# Patient Record
Sex: FEMALE | Race: WHITE | NOT HISPANIC OR LATINO
[De-identification: names, ages, dates, MRNs, and addresses within clinical notes are randomized per-mention and may not be internally consistent; named-entity substitution may affect disease eponyms.]

---

## 2020-05-29 PROBLEM — Z00.00 ENCOUNTER FOR PREVENTIVE HEALTH EXAMINATION: Status: ACTIVE | Noted: 2020-05-29

## 2020-06-16 ENCOUNTER — APPOINTMENT (OUTPATIENT)
Dept: PLASTIC SURGERY | Facility: CLINIC | Age: 30
End: 2020-06-16
Payer: COMMERCIAL

## 2020-06-16 PROCEDURE — 99204 OFFICE O/P NEW MOD 45 MIN: CPT

## 2020-07-01 NOTE — HISTORY OF PRESENT ILLNESS
[FreeTextEntry1] : 28 y/o F referred from Dr. 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 She desires larger breasts. She is otherwise healthy and does not smoke cigarettes. She has never had surgery before.

## 2020-07-01 NOTE — ASSESSMENT
[FreeTextEntry1] : I reviewed with the patient the risks benefits and alternatives of breast augmentation with prosthetic implants. The goal of the operation is to create a more enlarged breast mound by placing the implant in either the subglandular or dual plane/sub muscular position. The access incision can either be IMF or periareolar. IMF incision has less disruption of the breast gland ducts and therefore theoretically less chance of bacterial contamination and biofilm creation which is thought to be associated with capsular contracture. Submuscular placement of the implant has less risk of visible or palpable implant in the upper pole as well as theoretical benefit of decreased capsule contracture, however, it is associated with muscle animation deformity and greater postop pain. We discussed the differences between saline implants and silicone implants and the FDA recommendation for MRI surveillance postoperatively to rule out ruptured silicone implants. The risks of the surgery include the risk implant related complications such as implant infection, implant rupture, capsule contracture, implant malposition, palpable and visible rippling and need for revision surgery as well as asymmetry of the breasts, loss of nipple sensation ( or hypersensitivity), loss of the ability to breast feed, need for specialized techniques during mammography for breast cancer screening and likelihood that she will need revision surgery in the future as the implants are not designed to last a lifetime. \par \par She tried on a variety of size implants in the office to get a sense of how large she would like to be. I explained that the final size will be determined intra-operatively and that it will likely look a little smaller than it does simulating it with an implant under the bra.\par \par

## 2020-07-01 NOTE — PHYSICAL EXAM
[Bra Size: _______] : Bra Size: [unfilled] [de-identified] : Palpable left breast mass upper outer quadrant 3cm x 2cm firm, mobile, nontender, no skin retraction, no scars, left breast larger then right, no nipple discharge, SN - Nipple left and right =19 cm\par BD - 11.5 cm right, 12 cm left\par Nipple - IMF - 8 cm bilateral\par nipples everted bilateral\par 3o'clock non tender, firm mobile 3 cm x 2cm lump palpated on left

## 2020-07-14 ENCOUNTER — APPOINTMENT (OUTPATIENT)
Dept: DISASTER EMERGENCY | Facility: CLINIC | Age: 30
End: 2020-07-14

## 2020-07-14 DIAGNOSIS — Z01.818 ENCOUNTER FOR OTHER PREPROCEDURAL EXAMINATION: ICD-10-CM

## 2020-07-15 LAB — SARS-COV-2 N GENE NPH QL NAA+PROBE: NOT DETECTED

## 2020-07-16 ENCOUNTER — TRANSCRIPTION ENCOUNTER (OUTPATIENT)
Age: 30
End: 2020-07-16

## 2020-07-16 ENCOUNTER — APPOINTMENT (OUTPATIENT)
Dept: ULTRASOUND IMAGING | Facility: HOSPITAL | Age: 30
End: 2020-07-16

## 2020-07-17 ENCOUNTER — OUTPATIENT (OUTPATIENT)
Dept: OUTPATIENT SERVICES | Facility: HOSPITAL | Age: 30
LOS: 1 days | Discharge: HOME | End: 2020-07-17
Payer: COMMERCIAL

## 2020-07-17 ENCOUNTER — APPOINTMENT (OUTPATIENT)
Dept: PLASTIC SURGERY | Facility: CLINIC | Age: 30
End: 2020-07-17

## 2020-07-17 ENCOUNTER — RESULT REVIEW (OUTPATIENT)
Age: 30
End: 2020-07-17

## 2020-07-17 PROCEDURE — 14001 TIS TRNFR TRUNK 10.1-30SQCM: CPT

## 2020-07-17 PROCEDURE — 88307 TISSUE EXAM BY PATHOLOGIST: CPT | Mod: 26

## 2020-07-17 PROCEDURE — 19325 BREAST AUGMENTATION W/IMPLT: CPT | Mod: 50

## 2020-07-21 ENCOUNTER — APPOINTMENT (OUTPATIENT)
Dept: PLASTIC SURGERY | Facility: CLINIC | Age: 30
End: 2020-07-21
Payer: COMMERCIAL

## 2020-07-21 VITALS — HEART RATE: 98 BPM | HEIGHT: 64 IN | OXYGEN SATURATION: 57 % | WEIGHT: 123 LBS | BODY MASS INDEX: 21 KG/M2

## 2020-07-21 LAB — SURGICAL PATHOLOGY STUDY: SIGNIFICANT CHANGE UP

## 2020-07-21 PROCEDURE — 99024 POSTOP FOLLOW-UP VISIT: CPT

## 2020-07-21 NOTE — HISTORY OF PRESENT ILLNESS
[FreeTextEntry1] : 28 y/o F 4 days PO s/p tissue rearrangement and plastics closure following breast augmentation. Denies pain, not taking pain medication. No f/c/n/v/d/. Very happy with results

## 2020-07-21 NOTE — ASSESSMENT
[FreeTextEntry1] : - bandeau & sports Bra\par - aquaphor moisturizer to incisions and gauze\par -RTC in 3 weeks

## 2020-07-21 NOTE — SURGICAL HISTORY
[de-identified] : 07/17/20 - plastics closure and tissue rearrangement following breast augmentation

## 2020-07-21 NOTE — PHYSICAL EXAM
[de-identified] : bilateral implants in good position, incisions c/d/i, NAC pink and viable No collections or infection

## 2020-08-25 ENCOUNTER — APPOINTMENT (OUTPATIENT)
Dept: PLASTIC SURGERY | Facility: CLINIC | Age: 30
End: 2020-08-25

## 2020-08-25 ENCOUNTER — APPOINTMENT (OUTPATIENT)
Dept: PLASTIC SURGERY | Facility: CLINIC | Age: 30
End: 2020-08-25
Payer: COMMERCIAL

## 2020-08-25 PROCEDURE — 99024 POSTOP FOLLOW-UP VISIT: CPT

## 2020-08-25 NOTE — SURGICAL HISTORY
[de-identified] : 07/17/20 - b/l augmentation mammoplasty and oncoplastic closure w/ Sherri L = , Rt- [facility] -[id number]

## 2020-08-25 NOTE — PHYSICAL EXAM
[de-identified] : bilateral implants in good position, incisions healing well, NAC pink and viable. No infection, Left breast larger and firmer than right, likely swelling and induration from lumpectomy. Spitting suture on superior and medial left areola removed. IMF incisions B/L healing well

## 2020-08-25 NOTE — HISTORY OF PRESENT ILLNESS
[FreeTextEntry1] : 28 y/o F 1 month PO s/p b/l Augmentation mammoplasty, at the same time as and plastics closure following breast augmentation. Denies pain, not taking pain medication. No f/c/n/v/d.  Very happy with results. Left side bolanos than right likely do to swelling

## 2020-08-25 NOTE — ASSESSMENT
[FreeTextEntry1] : - aquaphor moisturizer to incisions \par - spitting suture removed w/ alcohol swab, superficial attmept at aspiration in the subcutaneous space at the periareolar incision attempted without succesful asipration - stayed superficial within the gland to avoid the submuscular implant\par - Will send for U/S eval and possible U/S guided aspiration to R/U deeper fluid collection and hasten recovery and wound healing.   \par - massage left breast\par - RTC in 6 weeks\par

## 2020-09-09 ENCOUNTER — OUTPATIENT (OUTPATIENT)
Dept: OUTPATIENT SERVICES | Facility: HOSPITAL | Age: 30
LOS: 1 days | End: 2020-09-09
Payer: COMMERCIAL

## 2020-09-09 ENCOUNTER — APPOINTMENT (OUTPATIENT)
Dept: ULTRASOUND IMAGING | Facility: HOSPITAL | Age: 30
End: 2020-09-09

## 2020-09-09 ENCOUNTER — APPOINTMENT (OUTPATIENT)
Dept: ULTRASOUND IMAGING | Facility: HOSPITAL | Age: 30
End: 2020-09-09
Payer: COMMERCIAL

## 2020-09-09 ENCOUNTER — RESULT REVIEW (OUTPATIENT)
Age: 30
End: 2020-09-09

## 2020-09-09 PROCEDURE — 76641 ULTRASOUND BREAST COMPLETE: CPT

## 2020-09-09 PROCEDURE — 76641 ULTRASOUND BREAST COMPLETE: CPT | Mod: 26,LT

## 2020-10-06 ENCOUNTER — APPOINTMENT (OUTPATIENT)
Dept: PLASTIC SURGERY | Facility: CLINIC | Age: 30
End: 2020-10-06
Payer: COMMERCIAL

## 2020-10-06 DIAGNOSIS — D24.2 BENIGN NEOPLASM OF LEFT BREAST: ICD-10-CM

## 2020-10-06 DIAGNOSIS — Z41.1 ENCOUNTER FOR COSMETIC SURGERY: ICD-10-CM

## 2020-10-06 PROCEDURE — 99024 POSTOP FOLLOW-UP VISIT: CPT

## 2020-10-06 RX ORDER — OXYCODONE 5 MG/1
5 TABLET ORAL
Qty: 15 | Refills: 0 | Status: DISCONTINUED | COMMUNITY
Start: 2020-07-17 | End: 2020-10-06

## 2020-10-06 NOTE — HISTORY OF PRESENT ILLNESS
[FreeTextEntry1] : 28 y/o F 4 months PO s/p b/l Augmentation mammoplasty, at the same time as plastics closure following breast augmentation. Denies pain, not taking pain medication. No f/c/n/v/d.  Very happy with results. Left side bolanos than right likely do to swelling. Massaging left breast daily. Went for US - no rupture, no fluid collections.

## 2020-10-06 NOTE — SURGICAL HISTORY
[de-identified] : 07/17/20 - b/l augmentation mammoplasty and oncoplastic closure w/ Sherri L = , Rt- [facility] -[id number]

## 2020-10-06 NOTE — ASSESSMENT
[FreeTextEntry1] : - aquaphor moisturizer to incisions \par - massage left breast\par - may need surgical excision of left breast mass if does not resolve - re-evaluate when 6 months PO \par -RTC in January

## 2020-12-22 ENCOUNTER — APPOINTMENT (OUTPATIENT)
Dept: ULTRASOUND IMAGING | Facility: HOSPITAL | Age: 30
End: 2020-12-22
Payer: COMMERCIAL

## 2020-12-22 ENCOUNTER — OUTPATIENT (OUTPATIENT)
Dept: OUTPATIENT SERVICES | Facility: HOSPITAL | Age: 30
LOS: 1 days | End: 2020-12-22
Payer: COMMERCIAL

## 2020-12-22 ENCOUNTER — APPOINTMENT (OUTPATIENT)
Dept: MAMMOGRAPHY | Facility: HOSPITAL | Age: 30
End: 2020-12-22
Payer: COMMERCIAL

## 2020-12-22 PROCEDURE — G0279: CPT | Mod: 26

## 2020-12-22 PROCEDURE — 77065 DX MAMMO INCL CAD UNI: CPT | Mod: 26,LT

## 2020-12-22 PROCEDURE — 77065 DX MAMMO INCL CAD UNI: CPT

## 2020-12-22 PROCEDURE — G0279: CPT

## 2020-12-22 PROCEDURE — 76641 ULTRASOUND BREAST COMPLETE: CPT | Mod: 26,LT

## 2020-12-22 PROCEDURE — 76641 ULTRASOUND BREAST COMPLETE: CPT

## 2021-01-07 NOTE — PHYSICAL EXAM
[de-identified] : bilateral implants in good position, incisions well healed, NAC pink and viable. No infection, Left breast swelling improved,  slightly larger than right but much better symmetry and contour from last visit. IMF incisions B/L well healed. palpable nodule at the 12 o'clock position superior to the periareolar incision on left breast at the lumpectomy site - possible fat necriosis / scar tissue?

## 2021-01-07 NOTE — SURGICAL HISTORY
[de-identified] : 07/17/20 - b/l augmentation mammoplasty and oncoplastic closure w/ Sherri L = , Rt- [facility] -[id number]

## 2021-01-11 ENCOUNTER — NON-APPOINTMENT (OUTPATIENT)
Age: 31
End: 2021-01-11

## 2021-01-12 ENCOUNTER — APPOINTMENT (OUTPATIENT)
Dept: PLASTIC SURGERY | Facility: CLINIC | Age: 31
End: 2021-01-12